# Patient Record
Sex: FEMALE
[De-identification: names, ages, dates, MRNs, and addresses within clinical notes are randomized per-mention and may not be internally consistent; named-entity substitution may affect disease eponyms.]

---

## 2018-07-30 PROBLEM — Z00.00 ENCOUNTER FOR PREVENTIVE HEALTH EXAMINATION: Status: ACTIVE | Noted: 2018-07-30

## 2018-08-03 ENCOUNTER — APPOINTMENT (OUTPATIENT)
Dept: VASCULAR SURGERY | Facility: CLINIC | Age: 53
End: 2018-08-03
Payer: COMMERCIAL

## 2018-08-03 VITALS — DIASTOLIC BLOOD PRESSURE: 63 MMHG | HEART RATE: 52 BPM | OXYGEN SATURATION: 98 % | SYSTOLIC BLOOD PRESSURE: 103 MMHG

## 2018-08-03 PROCEDURE — 99203 OFFICE O/P NEW LOW 30 MIN: CPT

## 2018-08-03 PROCEDURE — 93971 EXTREMITY STUDY: CPT

## 2018-10-05 ENCOUNTER — APPOINTMENT (OUTPATIENT)
Dept: VASCULAR SURGERY | Facility: CLINIC | Age: 53
End: 2018-10-05
Payer: COMMERCIAL

## 2018-10-05 PROCEDURE — 99213 OFFICE O/P EST LOW 20 MIN: CPT

## 2018-10-26 ENCOUNTER — APPOINTMENT (OUTPATIENT)
Dept: VASCULAR SURGERY | Facility: CLINIC | Age: 53
End: 2018-10-26

## 2018-11-16 ENCOUNTER — APPOINTMENT (OUTPATIENT)
Dept: VASCULAR SURGERY | Facility: CLINIC | Age: 53
End: 2018-11-16
Payer: COMMERCIAL

## 2018-11-16 DIAGNOSIS — I87.2 VENOUS INSUFFICIENCY (CHRONIC) (PERIPHERAL): ICD-10-CM

## 2018-11-16 PROCEDURE — 99213 OFFICE O/P EST LOW 20 MIN: CPT

## 2019-01-25 ENCOUNTER — APPOINTMENT (OUTPATIENT)
Dept: VASCULAR SURGERY | Facility: CLINIC | Age: 54
End: 2019-01-25
Payer: COMMERCIAL

## 2019-01-25 PROCEDURE — 36475 ENDOVENOUS RF 1ST VEIN: CPT | Mod: RT

## 2019-01-25 NOTE — PROCEDURE
[FreeTextEntry1] : R OLIVIA RFA [FreeTextEntry2] : Symptomatic GSV reflux [FreeTextEntry3] : Procedure: The patient’s leg was prepped and draped. Under local 1% Lidocaine the greater saphenous vein was punctured at the knee with a micropuncture needle and then the guidewire was inserted into the vein. This was performed under ultrasound guidance. The skin was incised with a #11 Blade, a dilator was inserted and then the 7 Fr. Introducer was placed into the greater saphenous vein. \par \par The fossa ovalis was visualized with the Duplex scan. The common femoral vein was confirmed to be patent. Duplex examination of the greater saphenous vein from the calf to the groin was performed. The greater saphenous and inferior epigastric vein were identified and the VNUS catheter was introduced through the introducer and into the vein up to the fossa ovalis. It was positioned 3 cm distal to the inferior epigastric vein.\par \par Tumescent solution (400 cc normal saline, 4cc of 8.4% Sodium bicarbonate and 40 cc 1% Lidoaine) was infiltrated subfascially over the vein. \par \par The VNUS ClosureFAST catheter checked for proper function. Thermal ablation was begun after the position of the catheter was once again confirmed to be 3 cm distal to the inferior epigastric branch of the greater saphenous vein. The proximal 7 cm was treated twice and then the rest of the vein was treated with single 20 sec cycles. The sheath and catheter were removed. Compression was applied for hemostasis. \par \par Confirmation of common femoral vein patency and occlusion of the greater saphenous vein was made with Duplex ultrasonography. \par \par \par

## 2019-01-28 ENCOUNTER — APPOINTMENT (OUTPATIENT)
Dept: VASCULAR SURGERY | Facility: CLINIC | Age: 54
End: 2019-01-28
Payer: COMMERCIAL

## 2019-01-28 PROCEDURE — 93971 EXTREMITY STUDY: CPT

## 2019-03-04 ENCOUNTER — APPOINTMENT (OUTPATIENT)
Dept: VASCULAR SURGERY | Facility: CLINIC | Age: 54
End: 2019-03-04
Payer: COMMERCIAL

## 2019-03-04 PROCEDURE — 99212 OFFICE O/P EST SF 10 MIN: CPT

## 2019-03-04 NOTE — REVIEW OF SYSTEMS
[Negative] : Heme/Lymph [Fever] : no fever [Chills] : no chills [Leg Claudication] : no intermittent leg claudication [Lower Ext Edema] : no lower extremity edema

## 2019-03-04 NOTE — PHYSICAL EXAM
[Normal Breath Sounds] : Normal breath sounds [Normal Heart Sounds] : normal heart sounds [2+] : right 2+ [No Rash or Lesion] : No rash or lesion [Alert] : alert [Calm] : calm [JVD] : no jugular venous distention  [Ankle Swelling (On Exam)] : not present [Varicose Veins Of Lower Extremities] : not present [] : not present [Abdomen Tenderness] : ~T ~M No abdominal tenderness [de-identified] : WN/WD

## 2019-03-04 NOTE — HISTORY OF PRESENT ILLNESS
[FreeTextEntry1] : 52 yo F with varicose veins and symptomatic GSV reflux requiring R GSV RFA on 1/25/19 returns for a f/u. Patient is doing well, states that her right leg feels lighter, denies pain, edema. She is happy withy the results of the procedure.

## 2024-05-16 NOTE — ASSESSMENT
Subjective   Patient ID: Kevin Donato is a 84 y.o. male who presents for 6 month follow up.    HPI     Review of Systems    Objective   There were no vitals taken for this visit.    Physical Exam    Assessment/Plan           [FreeTextEntry1] : 52 yo F with varicose veins and symptomatic GSV reflux requiring R GSV RFA on 1/25/19 returns for a f/u.\par Patient feels good, presents no c/os at this time.\par She is happy with the results of the procedure.\par F/u prn.

## 2024-05-30 ENCOUNTER — APPOINTMENT (OUTPATIENT)
Dept: VASCULAR SURGERY | Facility: CLINIC | Age: 59
End: 2024-05-30
Payer: COMMERCIAL

## 2024-05-30 VITALS
HEIGHT: 61 IN | HEART RATE: 62 BPM | SYSTOLIC BLOOD PRESSURE: 104 MMHG | BODY MASS INDEX: 23.6 KG/M2 | WEIGHT: 125 LBS | DIASTOLIC BLOOD PRESSURE: 67 MMHG

## 2024-05-30 DIAGNOSIS — R20.0 ANESTHESIA OF SKIN: ICD-10-CM

## 2024-05-30 DIAGNOSIS — R20.2 ANESTHESIA OF SKIN: ICD-10-CM

## 2024-05-30 DIAGNOSIS — M79.606 PAIN IN LEG, UNSPECIFIED: ICD-10-CM

## 2024-05-30 DIAGNOSIS — Z78.9 OTHER SPECIFIED HEALTH STATUS: ICD-10-CM

## 2024-05-30 PROCEDURE — 99214 OFFICE O/P EST MOD 30 MIN: CPT

## 2024-05-30 PROCEDURE — 93970 EXTREMITY STUDY: CPT

## 2024-05-31 PROBLEM — R20.0 NUMBNESS AND TINGLING OF BOTH LEGS: Status: ACTIVE | Noted: 2024-05-31

## 2024-05-31 PROBLEM — M79.606 PAIN OF LOWER EXTREMITY, UNSPECIFIED LATERALITY: Status: ACTIVE | Noted: 2024-05-31

## 2024-06-02 NOTE — ASSESSMENT
[FreeTextEntry1] : 58yoF, known to the practice (last seen in 2019 by Dr. Delgado) with hx of varicose veins and symptomatic GSV reflux requiring R GSV RFA on 1/25/19 presents for an evaluation. She c/o b/l diffuse legs pain and numbness, denies claudication, rest pain, skin changes. On exam, both legs are well perfused, mild right ankle edema noted, no skin changes. Palpable peripheral pulses throughout. Venous doppler was done in the office that demonstrated no evidence of DVT/SVT, R GSV is not visualized (s/p RFA) We discussed the findings and don't recommend any vascular intervention at this time. Explained that her symptoms are most likely musculoskeletal. We recommended to see Dr. Lopez (ortho), referral was provided. She may f/u as needed.

## 2024-06-02 NOTE — ADDENDUM
[FreeTextEntry1] : This note was written by Susie UMANZOR, acting as a scribe for Dr. Aleksandr Sheehan.  I, Dr. Aleksandr Sheehan, have read and attest that all the information, medical decision-making, and discharge instructions within are true and accurate.  I agree with the above. Main complaint is BLE shooting pain and tinglinh, which appears to be more neuropathic in nature. Palpable pedal pulses. Venous duplex US showed no DVT/SVT. R GSV is not visualized (s/p RFA by another provider). No acute vascular surgery intervention. Will refer to Dr. Lopez fort second opinion and further workup of other possible MSK/neuropathic etiologies of her symptoms.    I, Dr. Aleksandr Sheehan, personally performed the evaluation and management (E/M) services for this new patient.  That E/M includes conducting the initial examination, assessing all conditions, and establishing the plan of care.  Today, my ACP, Susie UMANZOR, was here to observe my evaluation and management services for this patient to be followed going forward.

## 2024-06-02 NOTE — PROCEDURE
[FreeTextEntry1] : Venous doppler was done in the office that demonstrated no evidence of DVT/SVT, R GSV is not visualized (s/p RFA)

## 2024-06-02 NOTE — PHYSICAL EXAM
[Respiratory Effort] : normal respiratory effort [Normal Heart Sounds] : normal heart sounds [Alert] : alert [Calm] : calm [2+] : left 2+ [Ankle Swelling (On Exam)] : present [Ankle Swelling On The Right] : mild [No Rash or Lesion] : No rash or lesion [Varicose Veins Of Lower Extremities] : not present [] : not present [Abdomen Tenderness] : ~T ~M No abdominal tenderness [de-identified] : WN/WD, NAD [de-identified] : NC/AT [de-identified] : FROM 5/5x4 [de-identified] : supple

## 2024-06-02 NOTE — HISTORY OF PRESENT ILLNESS
[FreeTextEntry1] : 58yoF, known to the practice (last seen in 2019 by Dr. Delgado) with hx of varicose veins and symptomatic GSV reflux requiring R GSV RFA on 1/25/19 presents for an evaluation. Patient developed right leg pain and noticed bulging varicose veins over her right medial ankle. She also mentions diffuse bilateral legs pains and numbness. She denies claudication, rest pain, skin changes.